# Patient Record
Sex: MALE | Race: WHITE | HISPANIC OR LATINO
[De-identification: names, ages, dates, MRNs, and addresses within clinical notes are randomized per-mention and may not be internally consistent; named-entity substitution may affect disease eponyms.]

---

## 2017-04-21 ENCOUNTER — APPOINTMENT (OUTPATIENT)
Dept: OPHTHALMOLOGY | Facility: CLINIC | Age: 42
End: 2017-04-21

## 2017-04-21 RX ORDER — LEVOTHYROXINE SODIUM 137 UG/1
TABLET ORAL
Refills: 0 | Status: ACTIVE | COMMUNITY

## 2018-08-17 ENCOUNTER — APPOINTMENT (OUTPATIENT)
Dept: OPHTHALMOLOGY | Facility: CLINIC | Age: 43
End: 2018-08-17
Payer: MEDICARE

## 2018-08-17 DIAGNOSIS — H18.453 NODULAR CORNEAL DEGENERATION, BILATERAL: ICD-10-CM

## 2018-08-17 DIAGNOSIS — H04.123 DRY EYE SYNDROME OF BILATERAL LACRIMAL GLANDS: ICD-10-CM

## 2018-08-17 PROCEDURE — 92012 INTRM OPH EXAM EST PATIENT: CPT

## 2018-08-17 PROCEDURE — 92025 CPTRIZED CORNEAL TOPOGRAPHY: CPT

## 2019-10-11 ENCOUNTER — APPOINTMENT (OUTPATIENT)
Dept: OPHTHALMOLOGY | Facility: CLINIC | Age: 44
End: 2019-10-11
Payer: MEDICARE

## 2019-10-11 ENCOUNTER — NON-APPOINTMENT (OUTPATIENT)
Age: 44
End: 2019-10-11

## 2019-10-11 PROCEDURE — 92014 COMPRE OPH EXAM EST PT 1/>: CPT

## 2021-07-15 ENCOUNTER — NON-APPOINTMENT (OUTPATIENT)
Age: 46
End: 2021-07-15

## 2021-07-15 ENCOUNTER — APPOINTMENT (OUTPATIENT)
Dept: OPHTHALMOLOGY | Facility: CLINIC | Age: 46
End: 2021-07-15
Payer: MEDICARE

## 2021-07-15 PROCEDURE — 92014 COMPRE OPH EXAM EST PT 1/>: CPT

## 2022-12-01 ENCOUNTER — APPOINTMENT (OUTPATIENT)
Dept: OPHTHALMOLOGY | Facility: CLINIC | Age: 47
End: 2022-12-01

## 2022-12-01 ENCOUNTER — NON-APPOINTMENT (OUTPATIENT)
Age: 47
End: 2022-12-01

## 2022-12-01 PROCEDURE — 92014 COMPRE OPH EXAM EST PT 1/>: CPT

## 2024-08-01 ENCOUNTER — APPOINTMENT (OUTPATIENT)
Dept: OPHTHALMOLOGY | Facility: CLINIC | Age: 49
End: 2024-08-01
Payer: MEDICARE

## 2024-08-01 ENCOUNTER — NON-APPOINTMENT (OUTPATIENT)
Age: 49
End: 2024-08-01

## 2024-08-01 PROCEDURE — 92014 COMPRE OPH EXAM EST PT 1/>: CPT

## 2024-11-08 ENCOUNTER — NON-APPOINTMENT (OUTPATIENT)
Age: 49
End: 2024-11-08

## 2024-11-08 ENCOUNTER — APPOINTMENT (OUTPATIENT)
Dept: OPHTHALMOLOGY | Facility: CLINIC | Age: 49
End: 2024-11-08
Payer: SELF-PAY

## 2024-11-08 PROCEDURE — 92015 DETERMINE REFRACTIVE STATE: CPT | Mod: NC

## 2025-05-30 ENCOUNTER — INPATIENT (INPATIENT)
Facility: HOSPITAL | Age: 50
LOS: 3 days | Discharge: NURSING, REHAB OR LONG-TERM CARE | DRG: 884 | End: 2025-06-03
Attending: INTERNAL MEDICINE | Admitting: INTERNAL MEDICINE
Payer: MEDICARE

## 2025-05-30 VITALS
HEART RATE: 60 BPM | RESPIRATION RATE: 20 BRPM | HEIGHT: 65 IN | SYSTOLIC BLOOD PRESSURE: 114 MMHG | OXYGEN SATURATION: 98 % | TEMPERATURE: 98 F | WEIGHT: 169.98 LBS | DIASTOLIC BLOOD PRESSURE: 69 MMHG

## 2025-05-30 DIAGNOSIS — R53.1 WEAKNESS: ICD-10-CM

## 2025-05-30 LAB
ALBUMIN SERPL ELPH-MCNC: 4.1 G/DL — SIGNIFICANT CHANGE UP (ref 3.3–5)
ALP SERPL-CCNC: 64 U/L — SIGNIFICANT CHANGE UP (ref 40–120)
ALT FLD-CCNC: 31 U/L — SIGNIFICANT CHANGE UP (ref 10–45)
ANION GAP SERPL CALC-SCNC: 15 MMOL/L — SIGNIFICANT CHANGE UP (ref 5–17)
ANISOCYTOSIS BLD QL: SLIGHT — SIGNIFICANT CHANGE UP
APPEARANCE UR: CLEAR — SIGNIFICANT CHANGE UP
APTT BLD: 28.3 SEC — SIGNIFICANT CHANGE UP (ref 26.1–36.8)
AST SERPL-CCNC: 41 U/L — HIGH (ref 10–40)
BASOPHILS # BLD AUTO: 0.19 K/UL — SIGNIFICANT CHANGE UP (ref 0–0.2)
BASOPHILS NFR BLD AUTO: 4.4 % — HIGH (ref 0–2)
BILIRUB SERPL-MCNC: 0.8 MG/DL — SIGNIFICANT CHANGE UP (ref 0.2–1.2)
BILIRUB UR-MCNC: NEGATIVE — SIGNIFICANT CHANGE UP
BUN SERPL-MCNC: 13 MG/DL — SIGNIFICANT CHANGE UP (ref 7–23)
BURR CELLS BLD QL SMEAR: SLIGHT — SIGNIFICANT CHANGE UP
CALCIUM SERPL-MCNC: 9.1 MG/DL — SIGNIFICANT CHANGE UP (ref 8.4–10.5)
CHLORIDE SERPL-SCNC: 102 MMOL/L — SIGNIFICANT CHANGE UP (ref 96–108)
CO2 SERPL-SCNC: 22 MMOL/L — SIGNIFICANT CHANGE UP (ref 22–31)
COLOR SPEC: YELLOW — SIGNIFICANT CHANGE UP
CREAT SERPL-MCNC: 1 MG/DL — SIGNIFICANT CHANGE UP (ref 0.5–1.3)
DACRYOCYTES BLD QL SMEAR: SLIGHT — SIGNIFICANT CHANGE UP
DIFF PNL FLD: NEGATIVE — SIGNIFICANT CHANGE UP
EGFR: 93 ML/MIN/1.73M2 — SIGNIFICANT CHANGE UP
EGFR: 93 ML/MIN/1.73M2 — SIGNIFICANT CHANGE UP
ELLIPTOCYTES BLD QL SMEAR: SLIGHT — SIGNIFICANT CHANGE UP
EOSINOPHIL # BLD AUTO: 0 K/UL — SIGNIFICANT CHANGE UP (ref 0–0.5)
EOSINOPHIL NFR BLD AUTO: 0 % — SIGNIFICANT CHANGE UP (ref 0–6)
FLUAV AG NPH QL: SIGNIFICANT CHANGE UP
FLUBV AG NPH QL: SIGNIFICANT CHANGE UP
GAS PNL BLDV: SIGNIFICANT CHANGE UP
GIANT PLATELETS BLD QL SMEAR: PRESENT — SIGNIFICANT CHANGE UP
GLUCOSE SERPL-MCNC: 86 MG/DL — SIGNIFICANT CHANGE UP (ref 70–99)
GLUCOSE UR QL: NEGATIVE MG/DL — SIGNIFICANT CHANGE UP
HCT VFR BLD CALC: 37.2 % — LOW (ref 39–50)
HGB BLD-MCNC: 12.1 G/DL — LOW (ref 13–17)
HYPOCHROMIA BLD QL: SLIGHT — SIGNIFICANT CHANGE UP
INR BLD: 1.13 RATIO — SIGNIFICANT CHANGE UP (ref 0.85–1.16)
KETONES UR QL: NEGATIVE MG/DL — SIGNIFICANT CHANGE UP
LEUKOCYTE ESTERASE UR-ACNC: NEGATIVE — SIGNIFICANT CHANGE UP
LYMPHOCYTES # BLD AUTO: 1.63 K/UL — SIGNIFICANT CHANGE UP (ref 1–3.3)
LYMPHOCYTES # BLD AUTO: 36.8 % — SIGNIFICANT CHANGE UP (ref 13–44)
MANUAL SMEAR VERIFICATION: SIGNIFICANT CHANGE UP
MCHC RBC-ENTMCNC: 21.2 PG — LOW (ref 27–34)
MCHC RBC-ENTMCNC: 32.5 G/DL — SIGNIFICANT CHANGE UP (ref 32–36)
MCV RBC AUTO: 65.1 FL — LOW (ref 80–100)
MONOCYTES # BLD AUTO: 0.27 K/UL — SIGNIFICANT CHANGE UP (ref 0–0.9)
MONOCYTES NFR BLD AUTO: 6.1 % — SIGNIFICANT CHANGE UP (ref 2–14)
NEUTROPHILS # BLD AUTO: 2.33 K/UL — SIGNIFICANT CHANGE UP (ref 1.8–7.4)
NEUTROPHILS NFR BLD AUTO: 43 % — SIGNIFICANT CHANGE UP (ref 43–77)
NEUTS BAND # BLD: 9.7 % — HIGH (ref 0–8)
NEUTS BAND NFR BLD: 9.7 % — HIGH (ref 0–8)
NITRITE UR-MCNC: NEGATIVE — SIGNIFICANT CHANGE UP
NRBC # BLD: 1 /100 WBCS — HIGH (ref 0–0)
NRBC BLD-RTO: 1 /100 WBCS — HIGH (ref 0–0)
PH UR: 7 — SIGNIFICANT CHANGE UP (ref 5–8)
PLAT MORPH BLD: ABNORMAL
PLATELET # BLD AUTO: 205 K/UL — SIGNIFICANT CHANGE UP (ref 150–400)
POIKILOCYTOSIS BLD QL AUTO: SIGNIFICANT CHANGE UP
POLYCHROMASIA BLD QL SMEAR: SLIGHT — SIGNIFICANT CHANGE UP
POTASSIUM SERPL-MCNC: 4.9 MMOL/L — SIGNIFICANT CHANGE UP (ref 3.5–5.3)
POTASSIUM SERPL-SCNC: 4.9 MMOL/L — SIGNIFICANT CHANGE UP (ref 3.5–5.3)
PROT SERPL-MCNC: 7.2 G/DL — SIGNIFICANT CHANGE UP (ref 6–8.3)
PROT UR-MCNC: NEGATIVE MG/DL — SIGNIFICANT CHANGE UP
PROTHROM AB SERPL-ACNC: 12.9 SEC — SIGNIFICANT CHANGE UP (ref 9.9–13.4)
RBC # BLD: 5.71 M/UL — SIGNIFICANT CHANGE UP (ref 4.2–5.8)
RBC # FLD: 19.4 % — HIGH (ref 10.3–14.5)
RBC BLD AUTO: ABNORMAL
RSV RNA NPH QL NAA+NON-PROBE: SIGNIFICANT CHANGE UP
SARS-COV-2 RNA SPEC QL NAA+PROBE: SIGNIFICANT CHANGE UP
SODIUM SERPL-SCNC: 139 MMOL/L — SIGNIFICANT CHANGE UP (ref 135–145)
SOURCE RESPIRATORY: SIGNIFICANT CHANGE UP
SP GR SPEC: 1.01 — SIGNIFICANT CHANGE UP (ref 1–1.03)
TARGETS BLD QL SMEAR: SLIGHT — SIGNIFICANT CHANGE UP
TSH SERPL-MCNC: 2.65 UIU/ML — SIGNIFICANT CHANGE UP (ref 0.27–4.2)
UROBILINOGEN FLD QL: 0.2 MG/DL — SIGNIFICANT CHANGE UP (ref 0.2–1)
WBC # BLD: 4.43 K/UL — SIGNIFICANT CHANGE UP (ref 3.8–10.5)
WBC # FLD AUTO: 4.43 K/UL — SIGNIFICANT CHANGE UP (ref 3.8–10.5)

## 2025-05-30 PROCEDURE — 70450 CT HEAD/BRAIN W/O DYE: CPT | Mod: 26,XU

## 2025-05-30 PROCEDURE — 71045 X-RAY EXAM CHEST 1 VIEW: CPT | Mod: 26

## 2025-05-30 PROCEDURE — 99285 EMERGENCY DEPT VISIT HI MDM: CPT | Mod: GC

## 2025-05-30 PROCEDURE — 70496 CT ANGIOGRAPHY HEAD: CPT | Mod: 26

## 2025-05-30 PROCEDURE — 93010 ELECTROCARDIOGRAM REPORT: CPT

## 2025-05-30 PROCEDURE — 70498 CT ANGIOGRAPHY NECK: CPT | Mod: 26

## 2025-05-30 RX ORDER — LEVOTHYROXINE SODIUM 300 MCG
50 TABLET ORAL DAILY
Refills: 0 | Status: DISCONTINUED | OUTPATIENT
Start: 2025-05-30 | End: 2025-06-03

## 2025-05-30 RX ORDER — ENOXAPARIN SODIUM 100 MG/ML
40 INJECTION SUBCUTANEOUS EVERY 24 HOURS
Refills: 0 | Status: DISCONTINUED | OUTPATIENT
Start: 2025-05-30 | End: 2025-06-03

## 2025-05-30 RX ORDER — LEVETIRACETAM 10 MG/ML
1000 INJECTION, SOLUTION INTRAVENOUS
Refills: 0 | Status: DISCONTINUED | OUTPATIENT
Start: 2025-05-30 | End: 2025-06-03

## 2025-05-30 RX ORDER — LANOLIN/MINERAL OIL/PETROLATUM
1 OINTMENT (GRAM) OPHTHALMIC (EYE)
Refills: 0 | Status: DISCONTINUED | OUTPATIENT
Start: 2025-05-30 | End: 2025-06-03

## 2025-05-30 RX ORDER — OMEGA-3-ACID ETHYL ESTERS CAPSULES 1 G/1
2 CAPSULE, LIQUID FILLED ORAL
Refills: 0 | Status: DISCONTINUED | OUTPATIENT
Start: 2025-05-30 | End: 2025-06-03

## 2025-05-30 RX ADMIN — LEVETIRACETAM 1000 MILLIGRAM(S): 10 INJECTION, SOLUTION INTRAVENOUS at 22:13

## 2025-05-30 RX ADMIN — Medication 1 DROP(S): at 22:13

## 2025-05-30 RX ADMIN — Medication 1000 MILLILITER(S): at 11:56

## 2025-05-30 RX ADMIN — Medication 1000 MILLILITER(S): at 15:28

## 2025-05-30 RX ADMIN — Medication 100 MILLILITER(S): at 22:09

## 2025-05-30 NOTE — ED PROVIDER NOTE - ATTENDING CONTRIBUTION TO CARE
Attending MD Moss:   I personally have seen and examined this patient. I personally made/approved the management plan and take responsibility for the patient management.  Resident note reviewed and agree on plan of care and except where noted.  Please see my MDM for further details and patient specific information.

## 2025-05-30 NOTE — ED PROVIDER NOTE - CLINICAL SUMMARY MEDICAL DECISION MAKING FREE TEXT BOX
Attending MD Moss:    Subjective:    - Chief Complaint (CC): 49-year-old male with PMH for Trisomy 21 presenting with generalized weakness, gait instability, and word-finding difficulties of unknown onset. This has been going on for several days per the patients mother.     - History of Present Illness: Rico Salomon, a 49-year-old male, presented to the emergency department with generalized weakness, gait instability, and word-finding difficulties. The exact time of onset is unknown. The patient was brought in from his  facility where staff noticed abnormal symptoms when he arrived by bus. His posture was described as 'off' from the moment he exited the bus until he was escorted to his room. The patient exhibited difficulty forming words, described as 'almost like a stutter, but within his jaw.' The  staff member accompanying the patient reported that these symptoms were present when he arrived at the facility, indicating the onset occurred prior to his arrival at . The patient's mother was reported to be on her way to the hospital at the time of the initial evaluation.    - History: The patient's full medical history is not available at the time of this evaluation. He lives with his mother and attends a  facility regularly. He typically walks independently but is always accompanied by a staff member at the facility. The patient does not use a walker. He is allergic to berries, which cause his face to turn red when consumed. No other significant medical history, surgical history, or family history was obtained during this initial assessment.     Review of Systems:    -  Neurological: Positive for weakness, particularly on the right side, gait instability, and word-finding difficulties.    -  Constitutional: Positive for feeling sleepy.    -  Respiratory: No reported issues with breathing.    -  Genitourinary: Patient reportedly uses the toilet for urination.     Objective:    - Vital Signs:      - Not documented in the provided conversation    - Physical Examination (PE):      - General: Patient is alert and responsive, but appears confused about his location and the identity of familiar individuals.      - Neurological:  strength appears normal bilaterally. Right-sided weakness noted, particularly in lower extremity. Patient has difficulty following complex commands (e.g., 'touch my hand with your foot'). Speech is improved from initial presentation but still abnormal.      - Cranial Nerves: Facial symmetry intact on smile. Able to close eyes tightly and open widely on command, though with some delay in response.      - Musculoskeletal: Decreased strength and mobility in right lower extremity compared to left.      - Respiratory: Clear breath sounds on auscultation.     Differential Diagnosis:    - Acute ischemic stroke, Transient ischemic attack, Ryan's paralysis, Complex migraine, Conversion disorder, Intracranial hemorrhage, Brain tumor     Assessment and Plan:    - Problem 1: Acute Neurological Changes    - Assessment/Plan: The patient presents with acute onset of right-sided weakness, gait instability, and word-finding difficulties, highly suspicious for an acute cerebrovascular event, most likely an ischemic stroke. The differential diagnosis includes TIA, complex migraine, and less likely, conversion disorder or intracranial hemorrhage.      - Obtain immediate CT head without contrast to rule out hemorrhage       - Consider CT angiography of head and neck to evaluate for large vessel occlusion       - Perform 12-lead ECG to assess for arrhythmias       - Order stat labs including CBC, CMP, coagulation studies (PT/INR, PTT), and troponin       - Obtain detailed history from family members regarding last known normal and any recent events       - Monitor neurological status closely       - Consider MRI brain if CT is negative and symptoms persist     EKG independently  interpreted by me shows sinus bradycardia at 52 and IRBBB.

## 2025-05-30 NOTE — ED PROVIDER NOTE - PHYSICAL EXAMINATION
Danna Bennett DO (PGY2)   Physical Exam:    Gen: NAD, non-toxic appearing  Lung: CTAB, no respiratory distress  CV: RRR, no murmurs, rubs or gallops  Abd: soft, NT, ND  Neuro: CN2-12 grossly intact, MS +5/5 in UE and LE BL, gross sensation intact in UE and LE BL, negative pronator drift,

## 2025-05-30 NOTE — ED ADULT NURSE REASSESSMENT NOTE - NS ED NURSE REASSESS COMMENT FT1
pt awake and alert, sitting up in stretcher. Pt changed and cleaned, linens cleaned. Pt awaiting transport to inpatient unit

## 2025-05-30 NOTE — ED PROVIDER NOTE - OBJECTIVE STATEMENT
48 yo M pmhx HLD, hypothyroidism, Down syndrome, Gout presents for evaluation of fatigue and weakness/difficulty walking. Patient's mother states that patient has been fatigued since Sunday but has no focal complaints or signs of infection. Today, staff felt that he was having difficulty ambulating on his own, appeared tired, and was speaking slowly. Denies fever, chills, chest pain, shortness of breath, abd pain, nausea, vomiting, hematuria, dysuria, or any other symptoms at this time. Danna Bennett,  (PGY-2)

## 2025-05-30 NOTE — ED PROVIDER NOTE - PROGRESS NOTE DETAILS
patient was unable to  stand independently, became weak and was assisted to the stretcher by nurse.  Patient's mom states that patient is often lethargic after seizure but does not endorse witnessing any seizure like activity. Danna Bennett,  (PGY-2)

## 2025-05-30 NOTE — H&P ADULT - HISTORY OF PRESENT ILLNESS
48 yo M pmhx HLD, hypothyroidism, Down syndrome, Gout presents for evaluation of fatigue and weakness/difficulty walking. Patient's mother states that patient has been fatigued since Sunday but has no focal complaints or signs of infection. Today, staff felt that he was having difficulty ambulating on his own, appeared tired, and was speaking slowly. Mother states that she spoke to patient's neurologist from [facility] and he rec to come to [facility].

## 2025-05-30 NOTE — ED ADULT NURSE NOTE - OBJECTIVE STATEMENT
48 y/o M A&Ox1 (oriented to self) PMH trisome 21, seizure disorder denies PSH presents to the ED via EMS c/o unsteady gait and weakness. Pt is a diffcult historian. As per EMS facility staff noticed pt had a change in gait, generalized weakness and speech changes upon his arrival to his adult program today. Upon ED arrival pt is well appearing. Breathing is even and unlabored. Skin is warm, dry & in tact. Pt appears tired but awakens to voice. IV access obtained. Comfort & safety provided. Pt aid from adult program at bedside. 50 y/o M A&Ox1 (oriented to self) PMH trisomy 21, seizure disorder denies PSH presents to the ED via EMS c/o unsteady gait and weakness. Pt is a diffcult historian. As per EMS facility staff noticed pt had a change in gait, generalized weakness and speech changes upon his arrival to his adult program today. Upon ED arrival pt is well appearing. Breathing is even and unlabored. Skin is warm, dry & in tact. Pt appears tired but awakens to voice. IV access obtained. Comfort & safety provided. Pt aid from adult program at bedside.

## 2025-05-30 NOTE — ED ADULT NURSE NOTE - NSFALLRISKINTERV_ED_ALL_ED
Assistance OOB with selected safe patient handling equipment if applicable/Assistance with ambulation/Communicate fall risk and risk factors to all staff, patient, and family/Monitor gait and stability/Provide visual cue: yellow wristband, yellow gown, etc/Reinforce activity limits and safety measures with patient and family/Call bell, personal items and telephone in reach/Instruct patient to call for assistance before getting out of bed/chair/stretcher/Non-slip footwear applied when patient is off stretcher/[place] to call system/Physically safe environment - no spills, clutter or unnecessary equipment/Purposeful Proactive Rounding/Room/bathroom lighting operational, light cord in reach

## 2025-05-30 NOTE — PATIENT PROFILE ADULT - FALL HARM RISK - HARM RISK INTERVENTIONS

## 2025-05-30 NOTE — ED ADULT NURSE NOTE - CHIEF COMPLAINT QUOTE
09-Oct-2018 15:30 Generalized weakness and fatigue, "stuttering more than usual" as per Day Program staff.

## 2025-05-30 NOTE — H&P ADULT - ASSESSMENT
The patient is a 49y Male was brought to the ER because of weakness and difficulty in walking.    Generalized weakness:    Hx of seizure  Mother states - he gets weakness after seizure  Neuro eval  EEG  PT eval  IVF    Down syndrome:    Assistance in ADL    Dw pt's mother.

## 2025-05-31 LAB
ALBUMIN SERPL ELPH-MCNC: 4.1 G/DL — SIGNIFICANT CHANGE UP (ref 3.3–5)
ALP SERPL-CCNC: 64 U/L — SIGNIFICANT CHANGE UP (ref 40–120)
ALT FLD-CCNC: 32 U/L — SIGNIFICANT CHANGE UP (ref 10–45)
ANION GAP SERPL CALC-SCNC: 14 MMOL/L — SIGNIFICANT CHANGE UP (ref 5–17)
AST SERPL-CCNC: 29 U/L — SIGNIFICANT CHANGE UP (ref 10–40)
BILIRUB SERPL-MCNC: 0.7 MG/DL — SIGNIFICANT CHANGE UP (ref 0.2–1.2)
BUN SERPL-MCNC: 12 MG/DL — SIGNIFICANT CHANGE UP (ref 7–23)
CALCIUM SERPL-MCNC: 9.2 MG/DL — SIGNIFICANT CHANGE UP (ref 8.4–10.5)
CHLORIDE SERPL-SCNC: 104 MMOL/L — SIGNIFICANT CHANGE UP (ref 96–108)
CO2 SERPL-SCNC: 23 MMOL/L — SIGNIFICANT CHANGE UP (ref 22–31)
CREAT SERPL-MCNC: 0.95 MG/DL — SIGNIFICANT CHANGE UP (ref 0.5–1.3)
CULTURE RESULTS: SIGNIFICANT CHANGE UP
EGFR: 99 ML/MIN/1.73M2 — SIGNIFICANT CHANGE UP
EGFR: 99 ML/MIN/1.73M2 — SIGNIFICANT CHANGE UP
GLUCOSE SERPL-MCNC: 73 MG/DL — SIGNIFICANT CHANGE UP (ref 70–99)
HCT VFR BLD CALC: 39.3 % — SIGNIFICANT CHANGE UP (ref 39–50)
HGB BLD-MCNC: 12.3 G/DL — LOW (ref 13–17)
MCHC RBC-ENTMCNC: 20.6 PG — LOW (ref 27–34)
MCHC RBC-ENTMCNC: 31.3 G/DL — LOW (ref 32–36)
MCV RBC AUTO: 65.7 FL — LOW (ref 80–100)
NRBC BLD AUTO-RTO: 0 /100 WBCS — SIGNIFICANT CHANGE UP (ref 0–0)
PLATELET # BLD AUTO: 202 K/UL — SIGNIFICANT CHANGE UP (ref 150–400)
POTASSIUM SERPL-MCNC: 3.8 MMOL/L — SIGNIFICANT CHANGE UP (ref 3.5–5.3)
POTASSIUM SERPL-SCNC: 3.8 MMOL/L — SIGNIFICANT CHANGE UP (ref 3.5–5.3)
PROT SERPL-MCNC: 6.7 G/DL — SIGNIFICANT CHANGE UP (ref 6–8.3)
RBC # BLD: 5.98 M/UL — HIGH (ref 4.2–5.8)
RBC # FLD: 19 % — HIGH (ref 10.3–14.5)
SODIUM SERPL-SCNC: 141 MMOL/L — SIGNIFICANT CHANGE UP (ref 135–145)
SPECIMEN SOURCE: SIGNIFICANT CHANGE UP
WBC # BLD: 3.73 K/UL — LOW (ref 3.8–10.5)
WBC # FLD AUTO: 3.73 K/UL — LOW (ref 3.8–10.5)

## 2025-05-31 RX ADMIN — Medication 1 DROP(S): at 11:05

## 2025-05-31 RX ADMIN — ENOXAPARIN SODIUM 40 MILLIGRAM(S): 100 INJECTION SUBCUTANEOUS at 06:05

## 2025-05-31 RX ADMIN — Medication 1 DROP(S): at 17:09

## 2025-05-31 RX ADMIN — OMEGA-3-ACID ETHYL ESTERS CAPSULES 2 GRAM(S): 1 CAPSULE, LIQUID FILLED ORAL at 17:09

## 2025-05-31 RX ADMIN — Medication 300 MILLIGRAM(S): at 11:05

## 2025-05-31 RX ADMIN — LEVETIRACETAM 1000 MILLIGRAM(S): 10 INJECTION, SOLUTION INTRAVENOUS at 09:37

## 2025-05-31 RX ADMIN — Medication 50 MICROGRAM(S): at 08:40

## 2025-05-31 RX ADMIN — Medication 1 DROP(S): at 08:41

## 2025-05-31 RX ADMIN — OMEGA-3-ACID ETHYL ESTERS CAPSULES 2 GRAM(S): 1 CAPSULE, LIQUID FILLED ORAL at 09:36

## 2025-05-31 RX ADMIN — Medication 500 MILLIGRAM(S): at 11:05

## 2025-05-31 RX ADMIN — LEVETIRACETAM 1000 MILLIGRAM(S): 10 INJECTION, SOLUTION INTRAVENOUS at 21:06

## 2025-05-31 NOTE — PHYSICAL THERAPY INITIAL EVALUATION ADULT - DIAGNOSIS, PT EVAL
Pt presents with mild impairments in strength, balance, endurance all impacting ability to perform ADLs and functional mobility

## 2025-05-31 NOTE — PHYSICAL THERAPY INITIAL EVALUATION ADULT - ADDITIONAL COMMENTS
As per [facility]  (bedside), pt resides in group [place] M-F with 3-4 steps to enter side entrance with bilateral handrail assist and first floor setup. Pt able to perform basic ADLs and functional ambulation with assist as needed/supervision from staff. Pt normally ambulates independently without AD, accompanied by staff for occasional balance deficits. As per shelter  (bedside), pt resides in group home M-F with 3-4 steps to enter side entrance with bilateral handrail assist and first floor setup. Pt able to perform basic ADLs and functional ambulation with assist as needed/supervision from staff. Pt normally ambulates independently without AD, accompanied by staff for occasional balance deficits. Patient goes home to [facility] on weekends 3+4 steps to enter and 1 FOS inside.

## 2025-05-31 NOTE — PHYSICAL THERAPY INITIAL EVALUATION ADULT - LEVEL OF INDEPENDENCE: SIT/STAND, REHAB EVAL
Mod Ax1 progressing to Min-CGAx1 with repetition/minimum assist (75% patients effort)/moderate assist (50% patients effort)

## 2025-05-31 NOTE — PHYSICAL THERAPY INITIAL EVALUATION ADULT - GENERAL OBSERVATIONS, REHAB EVAL
Pt received in bed in NAD, +condom cath, +IVL, +Group Home  bedside, VSS, agreeable to participate in therapy at this time

## 2025-05-31 NOTE — PHYSICAL THERAPY INITIAL EVALUATION ADULT - NSPTDISCHREC_GEN_A_CORE
resume assist/supervision for ALL aspects of functional mobility and ADLs from group home/mother/Home PT

## 2025-05-31 NOTE — PHYSICAL THERAPY INITIAL EVALUATION ADULT - PERTINENT HX OF CURRENT PROBLEM, REHAB EVAL
48 yo M pmhx HLD, hypothyroidism, Down syndrome, Gout presents for evaluation of fatigue and weakness/difficulty walking. Patient's mother states that patient has been fatigued since Sunday but has no focal complaints or signs of infection. Staff felt that he was having difficulty ambulating on his own, appeared tired, and was speaking slowly. Mother states that she spoke to patient's neurologist from [facility] and he rec to come to [facility].    50 y/o M A&Ox1 (oriented to self) PMH trisomy 21, seizure disorder denies PSH presents to the ED via EMS c/o unsteady gait and weakness    49-year-old male, presented to the emergency department with generalized weakness, gait instability, and word-finding difficulties. The exact time of onset is unknown. The patient was brought in from his  facility where staff noticed abnormal symptoms when he arrived by bus. His posture was described as 'off' from the moment he exited the bus until he was escorted to his room. The patient exhibited difficulty forming words, described as 'almost like a stutter, but within his jaw.' The  staff member accompanying the patient reported that these symptoms were present when he arrived at the facility, indicating the onset occurred prior to his arrival at . The patient's mother was reported to be on her way to the hospital at the time of the initial evaluation. 50 yo M pmhx HLD, hypothyroidism, Down syndrome, seizure disorder, Gout presents for evaluation of fatigue and weakness/difficulty walking. Patient's mother states that patient has been fatigued since Sunday but has no focal complaints or signs of infection. Staff felt that he was having difficulty ambulating on his own, appeared tired, and was speaking slowly. Mother states that she spoke to patient's neurologist from [facility] and he rec to come to [facility]. The patient was brought in from his  facility where staff noticed abnormal symptoms when he arrived by bus. His posture was described as 'off' from the moment he exited the bus until he was escorted to his room. The patient exhibited difficulty forming words, described as 'almost like a stutter, but within his jaw.' The  staff member accompanying the patient reported that these symptoms were present when he arrived at the facility, indicating the onset occurred prior to his arrival at . Pt admitted for unsteady gait and weakness.    CT Head, CTA Head & Neck 5/30/25: IMPRESSION: No acute hemorrhage mass or mass effect. Unremarkable CTA neck and [place] of [place].  Chest X-Ray 5/30/25: No focal consolidation.

## 2025-06-01 LAB
ANION GAP SERPL CALC-SCNC: 14 MMOL/L — SIGNIFICANT CHANGE UP (ref 5–17)
BUN SERPL-MCNC: 14 MG/DL — SIGNIFICANT CHANGE UP (ref 7–23)
CALCIUM SERPL-MCNC: 9.1 MG/DL — SIGNIFICANT CHANGE UP (ref 8.4–10.5)
CHLORIDE SERPL-SCNC: 103 MMOL/L — SIGNIFICANT CHANGE UP (ref 96–108)
CO2 SERPL-SCNC: 23 MMOL/L — SIGNIFICANT CHANGE UP (ref 22–31)
CREAT SERPL-MCNC: 0.86 MG/DL — SIGNIFICANT CHANGE UP (ref 0.5–1.3)
EGFR: 107 ML/MIN/1.73M2 — SIGNIFICANT CHANGE UP
EGFR: 107 ML/MIN/1.73M2 — SIGNIFICANT CHANGE UP
GLUCOSE SERPL-MCNC: 84 MG/DL — SIGNIFICANT CHANGE UP (ref 70–99)
HCT VFR BLD CALC: 38 % — LOW (ref 39–50)
HGB BLD-MCNC: 12.1 G/DL — LOW (ref 13–17)
MCHC RBC-ENTMCNC: 20.6 PG — LOW (ref 27–34)
MCHC RBC-ENTMCNC: 31.8 G/DL — LOW (ref 32–36)
MCV RBC AUTO: 64.8 FL — LOW (ref 80–100)
NRBC BLD AUTO-RTO: 0 /100 WBCS — SIGNIFICANT CHANGE UP (ref 0–0)
PLATELET # BLD AUTO: 194 K/UL — SIGNIFICANT CHANGE UP (ref 150–400)
POTASSIUM SERPL-MCNC: 3.8 MMOL/L — SIGNIFICANT CHANGE UP (ref 3.5–5.3)
POTASSIUM SERPL-SCNC: 3.8 MMOL/L — SIGNIFICANT CHANGE UP (ref 3.5–5.3)
RBC # BLD: 5.86 M/UL — HIGH (ref 4.2–5.8)
RBC # FLD: 19 % — HIGH (ref 10.3–14.5)
SODIUM SERPL-SCNC: 140 MMOL/L — SIGNIFICANT CHANGE UP (ref 135–145)
WBC # BLD: 4.34 K/UL — SIGNIFICANT CHANGE UP (ref 3.8–10.5)
WBC # FLD AUTO: 4.34 K/UL — SIGNIFICANT CHANGE UP (ref 3.8–10.5)

## 2025-06-01 RX ADMIN — Medication 500 MILLIGRAM(S): at 11:58

## 2025-06-01 RX ADMIN — OMEGA-3-ACID ETHYL ESTERS CAPSULES 2 GRAM(S): 1 CAPSULE, LIQUID FILLED ORAL at 18:01

## 2025-06-01 RX ADMIN — Medication 300 MILLIGRAM(S): at 11:57

## 2025-06-01 RX ADMIN — OMEGA-3-ACID ETHYL ESTERS CAPSULES 2 GRAM(S): 1 CAPSULE, LIQUID FILLED ORAL at 05:40

## 2025-06-01 RX ADMIN — Medication 1 DROP(S): at 18:01

## 2025-06-01 RX ADMIN — ENOXAPARIN SODIUM 40 MILLIGRAM(S): 100 INJECTION SUBCUTANEOUS at 05:41

## 2025-06-01 RX ADMIN — LEVETIRACETAM 1000 MILLIGRAM(S): 10 INJECTION, SOLUTION INTRAVENOUS at 21:20

## 2025-06-01 RX ADMIN — LEVETIRACETAM 1000 MILLIGRAM(S): 10 INJECTION, SOLUTION INTRAVENOUS at 11:57

## 2025-06-01 RX ADMIN — Medication 1 DROP(S): at 05:47

## 2025-06-01 RX ADMIN — Medication 50 MICROGRAM(S): at 05:41

## 2025-06-01 RX ADMIN — Medication 1 DROP(S): at 11:57

## 2025-06-01 NOTE — CONSULT NOTE ADULT - ASSESSMENT
50 yo M with HLD, hypothyroidism, Trisomy 21/Down syndrome, Gout presents for evaluation of fatigue and weakness/difficulty walking. Patient's mother states that patient has been fatigued since Sunday PTA but has no focal complaints or signs of infection. on day of admission, staff felt that he was having difficulty ambulating on his own, appeared tired, and was speaking slowly. Mother states that she spoke to patient's neurologist from [facility] and he rec to come to [facility].   CTH neg   CTA H/N neg   Impression:   1) AMS in setting of downs syndrome/trisomay 21  2) seizure h/o   3) weakness     - on keppra 1g BID; no change fo rnow   - infectous workup  - routine EEG  r/o seiuzres  - check TSH    - PT/OT    - check FS, glucose control <180  - GI/DVT ppx'  - Thank you for allowing me to participate in the care of this patient. Call with questions.   Rodney Schroeder MD  Vascular Neurology  Office: [phone]

## 2025-06-01 NOTE — CONSULT NOTE ADULT - SUBJECTIVE AND OBJECTIVE BOX
Neurology Consult    Reason for Consult: Patient is a 49y old  Male who presents with a chief complaint of The patient is a 49y Male was brought to the ER because of weakness and difficulty in walking. (31 May 2025 16:46)      HPI:   50 yo M pmhx HLD, hypothyroidism, Down syndrome, Gout presents for evaluation of fatigue and weakness/difficulty walking. Patient's mother states that patient has been fatigued since Sunday but has no focal complaints or signs of infection. Today, staff felt that he was having difficulty ambulating on his own, appeared tired, and was speaking slowly. Mother states that she spoke to patient's neurologist from [facility] and he rec to come to [facility].  (30 May 2025 18:33)       PAST MEDICAL & SURGICAL HISTORY:  Seizure      HTN (hypertension)          Allergies: Allergies    No Known Allergies    Intolerances        Social History: Denies toxic habits including tobacco, ETOH or illicit drugs.    Family History: FAMILY HISTORY:  . No family history of strokes    Medications: MEDICATIONS  (STANDING):  allopurinol 300 milliGRAM(s) Oral daily  artificial tears (preservative free) Ophthalmic Solution 1 Drop(s) Both EYES four times a day  ascorbic acid 500 milliGRAM(s) Oral daily  enoxaparin Injectable 40 milliGRAM(s) SubCutaneous every 24 hours  levETIRAcetam 1000 milliGRAM(s) Oral two times a day  levothyroxine 50 MICROGram(s) Oral daily  omega-3-Acid Ethyl Esters 2 Gram(s) Oral two times a day  sodium chloride 0.9%. 1000 milliLiter(s) (100 mL/Hr) IV Continuous <Continuous>    MEDICATIONS  (PRN):      Review of Systems: lmited given mental status   CONSTITUTIONAL:  No weight loss, fever, chills, weakness or fatigue.  HEENT:  Eyes:  No visual loss, blurred vision, double vision or yellow sclera. Ears, Nose, Throat:  No hearing loss, sneezing, congestion, runny nose or sore throat.  SKIN:  No rash or itching.  CARDIOVASCULAR:  No chest pain, chest pressure or chest discomfort. No palpitations or edema.  RESPIRATORY:  No shortness of breath, cough or sputum.  GASTROINTESTINAL:  No anorexia, nausea, vomiting or diarrhea. No abdominal pain or blood.  GENITOURINARY:  No burning on urination or incontinence   NEUROLOGICAL:  No headache, dizziness, syncope, paralysis, ataxia, numbness or tingling in the extremities. No change in bowel or bladder control. no limb weakness. no vision changes.   MUSCULOSKELETAL:  No muscle, back pain, joint pain or stiffness.  HEMATOLOGIC:  No anemia, bleeding or bruising.  LYMPHATICS:  No enlarged nodes. No history of splenectomy.  PSYCHIATRIC:  No history of depression or anxiety.  ENDOCRINOLOGIC:  No reports of sweating, cold or heat intolerance. No polyuria or polydipsia.      Vitals:  Vital Signs Last 24 Hrs  T(C): 36.7 (31 May 2025 21:22), Max: 36.7 (31 May 2025 21:22)  T(F): 98 (31 May 2025 21:22), Max: 98 (31 May 2025 21:22)  HR: 70 (31 May 2025 21:22) (56 - 70)  BP: 97/66 (31 May 2025 21:22) (96/65 - 104/68)  BP(mean): --  RR: 18 (31 May 2025 21:22) (17 - 18)  SpO2: 94% (31 May 2025 21:22) (94% - 100%)    Parameters below as of 31 May 2025 21:22  Patient On (Oxygen Delivery Method): room air        General Exam:   General Appearance: Appropriately dressed and in no acute distress       Head: Normocephalic, atraumatic and no dysmorphic features ; features c/w trisomy 21   Ear, Nose, and Throat: Moist mucous membranes  CVS: S1S2+  Resp: No SOB, no wheeze or rhonchi  GI: soft NT/ND  Extremities: No edema or cyanosis  Skin: No bruises or rashes     Neurological Exam:  Mental Status: Awake, alert and oriented x 1-2   Able to follow simple and complex verbal commands. Able to name and repeat. fluent speech. No obvious aphasia or dysarthria noted.   Cranial Nerves: PERRL, EOMI, VFFC, sensation V1-V3 intact,  no obvious facial asymmetry, features c/w trisomy 21  equal elevation of palate, scm/trap 5/5, tongue is midline on protrusion. no obvious papilledema on fundoscopic exam. hearing is grossly intact.   Motor: Normal bulk, tone and strength throughout. Fine finger movements were intact and symmetric. no tremors or drift noted.    Sensation: Intact to light touch and pinprick throughout. no right/left confusion.    Reflexes: 1+ throughout at biceps, brachioradialis, triceps, patellars and ankles bilaterally and equal. No clonus. R toe and L toe were both downgoing.  Coordination: No dysmetria on FNF   Gait: deferred     Data/Labs/Imaging which I personally reviewed.     Labs:     CBC Full  -  ( 01 Jun 2025 07:08 )  WBC Count : 4.34 K/uL  RBC Count : 5.86 M/uL  Hemoglobin : 12.1 g/dL  Hematocrit : 38.0 %  Platelet Count - Automated : 194 K/uL  Mean Cell Volume : 64.8 fl  Mean Cell Hemoglobin : 20.6 pg  Mean Cell Hemoglobin Concentration : 31.8 g/dL  Auto Neutrophil # : x  Auto Lymphocyte # : x  Auto Monocyte # : x  Auto Eosinophil # : x  Auto Basophil # : x  Auto Neutrophil % : x  Auto Lymphocyte % : x  Auto Monocyte % : x  Auto Eosinophil % : x  Auto Basophil % : x    06-01    140  |  103  |  14  ----------------------------<  84  3.8   |  23  |  0.86    Ca    9.1      01 Jun 2025 07:03    TPro  6.7  /  Alb  4.1  /  TBili  0.7  /  DBili  x   /  AST  29  /  ALT  32  /  AlkPhos  64  05-31    LIVER FUNCTIONS - ( 31 May 2025 07:24 )  Alb: 4.1 g/dL / Pro: 6.7 g/dL / ALK PHOS: 64 U/L / ALT: 32 U/L / AST: 29 U/L / GGT: x           PT/INR - ( 30 May 2025 11:55 )   PT: 12.9 sec;   INR: 1.13 ratio         PTT - ( 30 May 2025 11:55 )  PTT:28.3 sec  Urinalysis Basic - ( 01 Jun 2025 07:03 )    Color: x / Appearance: x / SG: x / pH: x  Gluc: 84 mg/dL / Ketone: x  / Bili: x / Urobili: x   Blood: x / Protein: x / Nitrite: x   Leuk Esterase: x / RBC: x / WBC x   Sq Epi: x / Non Sq Epi: x / Bacteria: x      < from: CT Head No Cont (05.30.25 @ 14:17) >    ACC: [id number] EXAM:  CT ANGIO BRAIN (W)AW IC   ORDERED BY:  BRAXTON DALLAS     ACC: [id number] EXAM:  CT ANGIO NECK (W)AW IC   ORDERED BY:  BRAXTON [facility]     ACC: [id number] EXAM:  CT BRAIN   ORDERED BY:  BRAXTON DALLAS     PROCEDURE DATE:  05/30/2025          INTERPRETATION:  CLINICAL INFORMATION: weakness    COMPARISON: None.    Multiple axial sections were performed from the base of skull to vertex   without contrast enhancement. Coronal and sagittal reconstructed images   were performed    The patient was injected with approximately 90 cc of Omnipaque 350 IV.   CTA of neck and [place] of [place] performed. Coronal cervical   reconstructed images were performed. 3-D MIPS performed.    Parenchymal volume loss and chronic microvascular ischemic changes are   identified    There is no acute hemorrhage mass or mass effect seen.    Evaluation of the osseous structures with the appropriate window appears   unremarkable    The visualized paranasal sinuses mastoid and middle ear regions appear   clear.    Both distal common carotid, proximal internal and external carotid   arteries appear normal. Normal flow seen involving both vertebral   arteries. No significant stenosis is seen.    Both distal internal carotid arteries appear normal.    Both anterior cerebral middle cerebral basilar and posterior cerebral   arteries appear normal without evidence of an aneurysm or significant   stenosis    The dural venous sinuses demonstrate normal enhancement    Evaluation of the soft tissue neck regionappears normal    The visualized portions of both lung apices appear clear.    Mild degenerative changes involving the cervical spine.    IMPRESSION: No acute hemorrhage mass or mass effect    Unremarkable CTA neck and [place] of [place].        --- End of Report ---            DOROTA ERICKSON MD; Attending Radiologist  This document has been electronically signed. May 30 2025  2:26PM    < end of copied text >

## 2025-06-02 ENCOUNTER — TRANSCRIPTION ENCOUNTER (OUTPATIENT)
Age: 50
End: 2025-06-02

## 2025-06-02 LAB
HCT VFR BLD CALC: 36.9 % — LOW (ref 39–50)
HGB BLD-MCNC: 11.9 G/DL — LOW (ref 13–17)
MCHC RBC-ENTMCNC: 20.7 PG — LOW (ref 27–34)
MCHC RBC-ENTMCNC: 32.2 G/DL — SIGNIFICANT CHANGE UP (ref 32–36)
MCV RBC AUTO: 64.2 FL — LOW (ref 80–100)
NRBC BLD AUTO-RTO: 0 /100 WBCS — SIGNIFICANT CHANGE UP (ref 0–0)
PLATELET # BLD AUTO: 191 K/UL — SIGNIFICANT CHANGE UP (ref 150–400)
RBC # BLD: 5.75 M/UL — SIGNIFICANT CHANGE UP (ref 4.2–5.8)
RBC # FLD: 18.7 % — HIGH (ref 10.3–14.5)
WBC # BLD: 4.39 K/UL — SIGNIFICANT CHANGE UP (ref 3.8–10.5)
WBC # FLD AUTO: 4.39 K/UL — SIGNIFICANT CHANGE UP (ref 3.8–10.5)

## 2025-06-02 PROCEDURE — 95816 EEG AWAKE AND DROWSY: CPT | Mod: 26

## 2025-06-02 RX ORDER — LEVOTHYROXINE SODIUM 300 MCG
1 TABLET ORAL
Refills: 0 | DISCHARGE

## 2025-06-02 RX ORDER — CLOTRIMAZOLE 1 G/100G
1 CREAM TOPICAL
Refills: 0 | DISCHARGE

## 2025-06-02 RX ORDER — GUAR GUM
15 PACKET (EA) ORAL
Refills: 0 | DISCHARGE

## 2025-06-02 RX ORDER — LEVETIRACETAM 10 MG/ML
1 INJECTION, SOLUTION INTRAVENOUS
Refills: 0 | DISCHARGE

## 2025-06-02 RX ORDER — DOCUSATE SODIUM 100 MG
2 CAPSULE ORAL
Refills: 0 | DISCHARGE

## 2025-06-02 RX ORDER — OMEGA-3-ACID ETHYL ESTERS CAPSULES 1 G/1
2 CAPSULE, LIQUID FILLED ORAL
Refills: 0 | DISCHARGE

## 2025-06-02 RX ORDER — LANOLIN/MINERAL OIL/PETROLATUM
1 OINTMENT (GRAM) OPHTHALMIC (EYE)
Refills: 0 | DISCHARGE

## 2025-06-02 RX ADMIN — Medication 1 DROP(S): at 11:08

## 2025-06-02 RX ADMIN — OMEGA-3-ACID ETHYL ESTERS CAPSULES 2 GRAM(S): 1 CAPSULE, LIQUID FILLED ORAL at 05:50

## 2025-06-02 RX ADMIN — LEVETIRACETAM 1000 MILLIGRAM(S): 10 INJECTION, SOLUTION INTRAVENOUS at 21:18

## 2025-06-02 RX ADMIN — Medication 1 DROP(S): at 17:13

## 2025-06-02 RX ADMIN — OMEGA-3-ACID ETHYL ESTERS CAPSULES 2 GRAM(S): 1 CAPSULE, LIQUID FILLED ORAL at 17:13

## 2025-06-02 RX ADMIN — Medication 1 DROP(S): at 05:50

## 2025-06-02 RX ADMIN — Medication 50 MICROGRAM(S): at 05:50

## 2025-06-02 RX ADMIN — Medication 300 MILLIGRAM(S): at 08:35

## 2025-06-02 RX ADMIN — Medication 1 DROP(S): at 21:18

## 2025-06-02 RX ADMIN — Medication 1 DROP(S): at 00:33

## 2025-06-02 RX ADMIN — ENOXAPARIN SODIUM 40 MILLIGRAM(S): 100 INJECTION SUBCUTANEOUS at 05:50

## 2025-06-02 RX ADMIN — Medication 500 MILLIGRAM(S): at 08:35

## 2025-06-02 RX ADMIN — LEVETIRACETAM 1000 MILLIGRAM(S): 10 INJECTION, SOLUTION INTRAVENOUS at 09:54

## 2025-06-02 NOTE — PROGRESS NOTE ADULT - ASSESSMENT
The patient is a 49y Male was brought to the ER because of weakness and difficulty in walking.    Generalized weakness:    Hx of seizure  Mother states - he gets weakness after seizure  Neuro eval called  EEG  PT eval appreciated  S/p IVF    Down syndrome:    Assistance in ADL    Dw pt's mother.  
The patient is a 49y Male was brought to the ER because of weakness and difficulty in walking.    Generalized weakness:    Hx of seizure  Mother states - he gets weakness after seizure  Neuro eval appreciated  EEG  PT eval appreciated  S/p IVF  Improving    Down syndrome:    Assistance in ADL    Dw pt's mother.  
The patient is a 49y Male was brought to the ER because of weakness and difficulty in walking.    Generalized weakness:    Hx of seizure  Mother states - he gets weakness after seizure  Neuro eval appreciated  EEG: No epileptiform activity  PT eval appreciated  Improving    Down syndrome:    Assistance in ADL    Dw pt's mother.

## 2025-06-02 NOTE — DISCHARGE NOTE PROVIDER - PROVIDER TOKENS
PROVIDER:[TOKEN:[[id number]],FOLLOWUP:[1 week]],PROVIDER:[TOKEN:[[id number]],FOLLOWUP:[2 weeks]]

## 2025-06-02 NOTE — PROGRESS NOTE ADULT - REASON FOR ADMISSION
The patient is a 49y Male was brought to the ER because of weakness and difficulty in walking.

## 2025-06-02 NOTE — EEG REPORT - NS EEG TEXT BOX
Patient Name: Rico Salomon    Age: 49 year  : 1975  Location: -  Referring Physician: -    EEG #: [id number]  Study Date: 2025   Start Time: 10:44:54 AM     Study Duration: 21.3 minutes    ------------------------------------------------------------------  Technical Information:					  On Instrument: [id number]  Placement and Labeling of Electrodes:  The EEG was performed utilizing 20 channels referential EEG connections (coronal over temporal over parasagittal montage) using all standard 10-20 electrode placements with EKG.  Recording was at a sampling rate of 256 samples per second per channel.  Time synchronized digital video recording was done simultaneously with EEG recording.  A low light infrared camera was used for low light recording.  Damien and seizure detection algorithms were utilized.  ------------------------------------------------------------------  History:  - 49 year old Male with Down Syndrome and AMS is here for evaluation      Medication  Laxapro    Keppra (Levetiracetam)    ------------------------------------------------------------------  Study Interpretation:    FINDINGS:    Background:  The background was continuous, spontaneously variable and reactive.  During wakefulness, the posteriorly dominant rhythm is unclear and the background is diffusely slow consisting of polymorphic delta theta activity up to 6 Hz      GENERALIZED SLOWING: present. As above  FOCAL SLOWING: none was present.    Sleep Background:  Drowsiness was characterized by fragmentation, attenuation, and slowing of the background activity.    Stage II sleep transients were not recorded.      Non-epileptiform activity:  None.    Epileptiform Activity:   No epileptiform discharges were present.    Events:  No clinical events were recorded.  No seizures were recorded.    Activation Procedures:   Hyperventilation was not performed.    Photic stimulation was not performed.      Artifacts:  Intermittent myogenic and movement artifacts were noted.    ECG:  The heart rate on single channel ECG was predominantly between 60-70 BPM.  ------------------------------------------------------------------  EEG CLASSIFICATION:    Abnormal EEG in the awake, and drowsy states.  1. Mild to moderate diffuse background slowing  	    ------------------------------------------------------------------  CLINICAL IMPRESSION:  Mild to Moderate nonspecific diffuse or multifocal cerebral dysfunction.     No epileptiform pattern or seizure recorded.  ------------------------------------------------------------------    PEDRO Bruno  Attending Physician, [facility]    ------------------------------------  EEG Reading Room: [phone]  On Call Service After Hours: [phone]

## 2025-06-02 NOTE — DISCHARGE NOTE PROVIDER - CARE PROVIDERS DIRECT ADDRESSES
,[email or web address]

## 2025-06-02 NOTE — DISCHARGE NOTE PROVIDER - NSFTFHOMEHTHYNRD_GEN_ALL_CORE
Pt in via EMS from [address] [facility] after waking up at gaudencio Yes

## 2025-06-02 NOTE — DISCHARGE NOTE PROVIDER - NSDCCPCAREPLAN_GEN_ALL_CORE_FT
PRINCIPAL DISCHARGE DIAGNOSIS  Diagnosis: Weakness  Assessment and Plan of Treatment:   Paco has been admitted with weakness and difficulty walking  EEG negative for seizures  CT Head negative for acute findings  Lepra level WNL   Orthostatics negative   C/w Keppra as prescribed  Follow up with neurology in 1 week   Maintain fall precautions, use walker as needed,   encouraged to get up slowly to prevent dizziness/ fall. Call for assistance if needed before getting up   Stay hydrated   Report any acute changes to MD or call 911      SECONDARY DISCHARGE DIAGNOSES  Diagnosis: Hyperlipidemia  Assessment and Plan of Treatment: Low salt, low fat, low cholesterol, diabetic diet if appropriate  Continue medication as prescribed  Exercise, increase your activity level  Pt. verbalized an understanding of all instructions.

## 2025-06-02 NOTE — PROGRESS NOTE ADULT - SUBJECTIVE AND OBJECTIVE BOX
Patient is a 49y old  Male who presents with a chief complaint of The patient is a 49y Male was brought to the ER because of weakness and difficulty in walking. (01 Jun 2025 09:02)      SUBJECTIVE / OVERNIGHT EVENTS:    Events noted.  No N/V  Sitting in a chair    MEDICATIONS  (STANDING):  allopurinol 300 milliGRAM(s) Oral daily  artificial tears (preservative free) Ophthalmic Solution 1 Drop(s) Both EYES four times a day  ascorbic acid 500 milliGRAM(s) Oral daily  enoxaparin Injectable 40 milliGRAM(s) SubCutaneous every 24 hours  levETIRAcetam 1000 milliGRAM(s) Oral two times a day  levothyroxine 50 MICROGram(s) Oral daily  omega-3-Acid Ethyl Esters 2 Gram(s) Oral two times a day  sodium chloride 0.9%. 1000 milliLiter(s) (100 mL/Hr) IV Continuous <Continuous>    MEDICATIONS  (PRN):        CAPILLARY BLOOD GLUCOSE        I&O's Summary    31 May 2025 07:01  -  01 Jun 2025 07:00  --------------------------------------------------------  IN: 240 mL / OUT: 850 mL / NET: -610 mL    01 Jun 2025 07:01  -  01 Jun 2025 18:11  --------------------------------------------------------  IN: 360 mL / OUT: 0 mL / NET: 360 mL        T(C): 36.3 (06-01-25 @ 12:00), Max: 36.7 (05-31-25 @ 21:22)  HR: 64 (06-01-25 @ 12:00) (64 - 70)  BP: 101/64 (06-01-25 @ 12:00) (97/66 - 101/64)  RR: 18 (06-01-25 @ 12:00) (18 - 18)  SpO2: 94% (06-01-25 @ 12:00) (94% - 94%)    PHYSICAL EXAM:    NECK: Supple, No JVD  CHEST/LUNG: Clear to auscultation bilaterally; No wheezing.  HEART: Regular rate and rhythm; No murmurs, rubs, or gallops  ABDOMEN: Soft, Nontender, Nondistended; Bowel sounds present  EXTREMITIES:   No edema  NEUROLOGY: AA      LABS:                        12.1   4.34  )-----------( 194      ( 01 Jun 2025 07:08 )             38.0     06-01    140  |  103  |  14  ----------------------------<  84  3.8   |  23  |  0.86    Ca    9.1      01 Jun 2025 07:03    TPro  6.7  /  Alb  4.1  /  TBili  0.7  /  DBili  x   /  AST  29  /  ALT  32  /  AlkPhos  64  05-31          Urinalysis Basic - ( 01 Jun 2025 07:03 )    Color: x / Appearance: x / SG: x / pH: x  Gluc: 84 mg/dL / Ketone: x  / Bili: x / Urobili: x   Blood: x / Protein: x / Nitrite: x   Leuk Esterase: x / RBC: x / WBC x   Sq Epi: x / Non Sq Epi: x / Bacteria: x      CAPILLARY BLOOD GLUCOSE            RADIOLOGY & ADDITIONAL TESTS:    Imaging Personally Reviewed:    Consultant(s) Notes Reviewed:      Care Discussed with Consultants/Other Providers:    Alonso Diamond MD, CMD, FACP    [address] [id number]  Office Tel: [phone]    
Patient is a 49y old  Male who presents with a chief complaint of The patient is a 49y Male was brought to the ER because of weakness and difficulty in walking. (02 Jun 2025 14:58)      SUBJECTIVE / OVERNIGHT EVENTS:    Events noted.  Awake  No N/V      MEDICATIONS  (STANDING):  allopurinol 300 milliGRAM(s) Oral daily  artificial tears (preservative free) Ophthalmic Solution 1 Drop(s) Both EYES four times a day  ascorbic acid 500 milliGRAM(s) Oral daily  enoxaparin Injectable 40 milliGRAM(s) SubCutaneous every 24 hours  levETIRAcetam 1000 milliGRAM(s) Oral two times a day  levothyroxine 50 MICROGram(s) Oral daily  omega-3-Acid Ethyl Esters 2 Gram(s) Oral two times a day  sodium chloride 0.9%. 1000 milliLiter(s) (100 mL/Hr) IV Continuous <Continuous>    MEDICATIONS  (PRN):        CAPILLARY BLOOD GLUCOSE        I&O's Summary    01 Jun 2025 07:01  -  02 Jun 2025 07:00  --------------------------------------------------------  IN: 840 mL / OUT: 2725 mL / NET: -1885 mL        T(C): 36.5 (06-02-25 @ 21:51), Max: 36.7 (06-02-25 @ 12:34)  HR: 62 (06-02-25 @ 21:51) (62 - 66)  BP: 99/65 (06-02-25 @ 21:51) (99/65 - 103/69)  RR: 18 (06-02-25 @ 21:51) (18 - 18)  SpO2: 96% (06-02-25 @ 21:51) (96% - 98%)    PHYSICAL EXAM:    NECK: Supple, No JVD  CHEST/LUNG: Clear to auscultation bilaterally; No wheezing.  HEART: Regular rate and rhythm; No murmurs, rubs, or gallops  ABDOMEN: Soft, Nontender, Nondistended; Bowel sounds present  EXTREMITIES:   No edema  NEUROLOGY: AA      LABS:                        11.9   4.39  )-----------( 191      ( 02 Jun 2025 06:59 )             36.9     06-01    140  |  103  |  14  ----------------------------<  84  3.8   |  23  |  0.86    Ca    9.1      01 Jun 2025 07:03            Urinalysis Basic - ( 01 Jun 2025 07:03 )    Color: x / Appearance: x / SG: x / pH: x  Gluc: 84 mg/dL / Ketone: x  / Bili: x / Urobili: x   Blood: x / Protein: x / Nitrite: x   Leuk Esterase: x / RBC: x / WBC x   Sq Epi: x / Non Sq Epi: x / Bacteria: x      CAPILLARY BLOOD GLUCOSE            RADIOLOGY & ADDITIONAL TESTS:    Imaging Personally Reviewed:    Consultant(s) Notes Reviewed:      Care Discussed with Consultants/Other Providers:    Alonso Diamond MD, CMD, FACP    [address] [address]  Office Tel: [phone]    
Patient is a 49y old  Male who presents with a chief complaint of The patient is a 49y Male was brought to the ER because of weakness and difficulty in walking. (30 May 2025 18:33)      SUBJECTIVE / OVERNIGHT EVENTS:    Events noted. Sitting in a chair  Mother at bedside  CONSTITUTIONAL: No fever,  or fatigue  RESPIRATORY: No cough, wheezing,  No shortness of breath  CARDIOVASCULAR: No chest pain, palpitations  GASTROINTESTINAL: No abdominal or epigastric pain.       MEDICATIONS  (STANDING):  allopurinol 300 milliGRAM(s) Oral daily  artificial tears (preservative free) Ophthalmic Solution 1 Drop(s) Both EYES four times a day  ascorbic acid 500 milliGRAM(s) Oral daily  enoxaparin Injectable 40 milliGRAM(s) SubCutaneous every 24 hours  levETIRAcetam 1000 milliGRAM(s) Oral two times a day  levothyroxine 50 MICROGram(s) Oral daily  omega-3-Acid Ethyl Esters 2 Gram(s) Oral two times a day  sodium chloride 0.9%. 1000 milliLiter(s) (100 mL/Hr) IV Continuous <Continuous>    MEDICATIONS  (PRN):        CAPILLARY BLOOD GLUCOSE        I&O's Summary      T(C): 36.7 (05-31-25 @ 21:22), Max: 36.7 (05-31-25 @ 04:53)  HR: 70 (05-31-25 @ 21:22) (56 - 70)  BP: 97/66 (05-31-25 @ 21:22) (96/65 - 104/68)  RR: 18 (05-31-25 @ 21:22) (17 - 18)  SpO2: 94% (05-31-25 @ 21:22) (94% - 100%)    PHYSICAL EXAM:    NECK: Supple, No JVD  CHEST/LUNG: Clear to auscultation bilaterally; No wheezing.  HEART: Regular rate and rhythm; No murmurs, rubs, or gallops  ABDOMEN: Soft, Nontender, Nondistended; Bowel sounds present  EXTREMITIES:   No edema  NEUROLOGY: AA      LABS:                        12.3   3.73  )-----------( 202      ( 31 May 2025 07:24 )             39.3     05-31    141  |  104  |  12  ----------------------------<  73  3.8   |  23  |  0.95    Ca    9.2      31 May 2025 07:24    TPro  6.7  /  Alb  4.1  /  TBili  0.7  /  DBili  x   /  AST  29  /  ALT  32  /  AlkPhos  64  05-31    PT/INR - ( 30 May 2025 11:55 )   PT: 12.9 sec;   INR: 1.13 ratio         PTT - ( 30 May 2025 11:55 )  PTT:28.3 sec      Urinalysis Basic - ( 31 May 2025 07:24 )    Color: x / Appearance: x / SG: x / pH: x  Gluc: 73 mg/dL / Ketone: x  / Bili: x / Urobili: x   Blood: x / Protein: x / Nitrite: x   Leuk Esterase: x / RBC: x / WBC x   Sq Epi: x / Non Sq Epi: x / Bacteria: x      CAPILLARY BLOOD GLUCOSE            RADIOLOGY & ADDITIONAL TESTS:    Imaging Personally Reviewed:    Consultant(s) Notes Reviewed:      Care Discussed with Consultants/Other Providers:    Alonso Diamond MD, CMD, FACP    [address] [address]  Office Tel: [phone]

## 2025-06-02 NOTE — DISCHARGE NOTE PROVIDER - NSDCMRMEDTOKEN_GEN_ALL_CORE_FT
allopurinol 300 mg oral tablet: 1 tab(s) orally once a day  ascorbic acid 500 mg oral tablet: 1 tab(s) orally once a day  Benefiber Advanced oral powder for reconstitution: 15 gram(s) orally 2 times a day  clotrimazole 1% topical cream: Apply topically to affected area 2 times a day  docusate sodium 100 mg oral capsule: 2 cap(s) orally once a day (at bedtime)  Home Physical Therapy: evaluate and treat  levETIRAcetam 1000 mg oral tablet: 1 tab(s) orally 2 times a day  levothyroxine 50 mcg (0.05 mg) oral tablet: 1 tab(s) orally once a day  ocular lubricant: 1 drop(s) in each eye 4 times a day as needed for  dry eyes  Omega-3 1000 mg oral capsule: 2 cap(s) orally 2 times a day   allopurinol 300 mg oral tablet: 1 tab(s) orally once a day  ascorbic acid 500 mg oral tablet: 1 tab(s) orally once a day  Benefiber Advanced oral powder for reconstitution: 15 gram(s) orally 2 times a day  clotrimazole 1% topical cream: Apply topically to affected area 2 times a day  Commode: NOY 99  Wt - 81.00kg  Ht - 167.6cm  docusate sodium 100 mg oral capsule: 2 cap(s) orally once a day (at bedtime)  Home Physical Therapy: evaluate and treat  levETIRAcetam 1000 mg oral tablet: 1 tab(s) orally 2 times a day  levothyroxine 50 mcg (0.05 mg) oral tablet: 1 tab(s) orally once a day  ocular lubricant: 1 drop(s) in each eye 4 times a day as needed for  dry eyes  Omega-3 1000 mg oral capsule: 2 cap(s) orally 2 times a day   allopurinol 300 mg oral tablet: 1 tab(s) orally once a day  ascorbic acid 500 mg oral tablet: 1 tab(s) orally once a day  Benefiber Advanced oral powder for reconstitution: 15 gram(s) orally 2 times a day  clotrimazole 1% topical cream: Apply topically to affected area 2 times a day  Commode: NOY 99  Wt - 81.00kg  Ht - 167.6cm  docusate sodium 100 mg oral capsule: 2 cap(s) orally once a day (at bedtime)  Home Physical Therapy: evaluate and treat  levETIRAcetam 1000 mg oral tablet: 1 tab(s) orally 2 times a day  levothyroxine 50 mcg (0.05 mg) oral tablet: 1 tab(s) orally once a day  ocular lubricant: 1 drop(s) in each eye 4 times a day as needed for  dry eyes  Omega-3 1000 mg oral capsule: 2 cap(s) orally 2 times a day  PT: use as needed  shower chair: use as needed  walker: use as needed

## 2025-06-02 NOTE — DISCHARGE NOTE PROVIDER - HOSPITAL COURSE
Please let the patient know she has a UTI I have sent an antibiotic to her pharmacy.   This 49-year-old male with Down syndrome, hyperlipidemia, hypothyroidism, and a history of seizures and gout presented to the emergency room with generalized weakness and difficulty walking. His mother reported the weakness began on Sunday and worsened to the point where he was having difficulty ambulating, speaking slowly, and appeared tired. She denied any focal complaints or signs of infection. His neurologist recommended evaluation at this facility. A CT of the head and CTA of the head and neck were negative. The differential diagnosis included altered mental status in the setting of Down syndrome, possible seizure activity, and generalized weakness. He was maintained on his current Keppra dose. An infectious workup was negative, an EEG was ordered to rule out seizures, and TSH was checked. Physical therapy was consulted. He was discharged with a plan for continued outpatient management. This 49-year-old male with Down syndrome, hyperlipidemia, hypothyroidism, and a history of seizures and gout presented to the emergency room with generalized weakness and difficulty walking. His mother reported the weakness began on Sunday and worsened to the point where he was having difficulty ambulating, speaking slowly, and appeared tired. She denied any focal complaints or signs of infection. His neurologist recommended evaluation at this facility. A CT of the head and CTA of the head and neck were negative. The differential diagnosis included altered mental status in the setting of Down syndrome, possible seizure activity, and generalized weakness. He was maintained on his current Keppra dose. An infectious workup was negative, an EEG was ordered and ruled out seizures, and TSH was checked. Physical therapy was consulted. He was discharged with a plan for continued outpatient management.    Hospital Course:  EEG negative for seizures  Orthostatics negative  Keppra level WNL       Important Medication Changes and Reason:  See MAR     Active or Pending Issues Requiring Follow-up:  Follow up with neurology -1 week   Follow up with PCP-2 weeks      Advanced Directives:   [x ] Full code  [ ] DNR  [ ] Hospice    Discharge Diagnoses:  Weakness

## 2025-06-02 NOTE — DISCHARGE NOTE PROVIDER - CARE PROVIDER_API CALL
Rodney Schroeder  Neurology  [address], [address]  [place] [address]  Phone: [phone]  Fax: [phone]  Follow Up Time: 1 week    CRISTOFER QUINONES  [address] [place] [address]  Phone: [phone]  Fax: [phone]  Follow Up Time: 2 weeks

## 2025-06-03 ENCOUNTER — TRANSCRIPTION ENCOUNTER (OUTPATIENT)
Age: 50
End: 2025-06-03

## 2025-06-03 VITALS
OXYGEN SATURATION: 98 % | DIASTOLIC BLOOD PRESSURE: 67 MMHG | TEMPERATURE: 98 F | RESPIRATION RATE: 17 BRPM | HEART RATE: 87 BPM | SYSTOLIC BLOOD PRESSURE: 100 MMHG

## 2025-06-03 LAB
LEVETIRACETAM SERPL-MCNC: 23 UG/ML — SIGNIFICANT CHANGE UP (ref 10–40)
LEVETIRACETAM SERPL-MCNC: 30.4 UG/ML — SIGNIFICANT CHANGE UP (ref 10–40)

## 2025-06-03 PROCEDURE — 85027 COMPLETE CBC AUTOMATED: CPT

## 2025-06-03 PROCEDURE — 87637 SARSCOV2&INF A&B&RSV AMP PRB: CPT

## 2025-06-03 PROCEDURE — 0241U: CPT

## 2025-06-03 PROCEDURE — 70498 CT ANGIOGRAPHY NECK: CPT

## 2025-06-03 PROCEDURE — 81003 URINALYSIS AUTO W/O SCOPE: CPT

## 2025-06-03 PROCEDURE — 71045 X-RAY EXAM CHEST 1 VIEW: CPT

## 2025-06-03 PROCEDURE — 87040 BLOOD CULTURE FOR BACTERIA: CPT

## 2025-06-03 PROCEDURE — 80048 BASIC METABOLIC PNL TOTAL CA: CPT

## 2025-06-03 PROCEDURE — 85730 THROMBOPLASTIN TIME PARTIAL: CPT

## 2025-06-03 PROCEDURE — 82435 ASSAY OF BLOOD CHLORIDE: CPT

## 2025-06-03 PROCEDURE — 82962 GLUCOSE BLOOD TEST: CPT

## 2025-06-03 PROCEDURE — 87086 URINE CULTURE/COLONY COUNT: CPT

## 2025-06-03 PROCEDURE — 93005 ELECTROCARDIOGRAM TRACING: CPT

## 2025-06-03 PROCEDURE — 95816 EEG AWAKE AND DROWSY: CPT

## 2025-06-03 PROCEDURE — 99285 EMERGENCY DEPT VISIT HI MDM: CPT

## 2025-06-03 PROCEDURE — 85610 PROTHROMBIN TIME: CPT

## 2025-06-03 PROCEDURE — 85025 COMPLETE CBC W/AUTO DIFF WBC: CPT

## 2025-06-03 PROCEDURE — 82947 ASSAY GLUCOSE BLOOD QUANT: CPT

## 2025-06-03 PROCEDURE — 97162 PT EVAL MOD COMPLEX 30 MIN: CPT

## 2025-06-03 PROCEDURE — 70496 CT ANGIOGRAPHY HEAD: CPT

## 2025-06-03 PROCEDURE — 36415 COLL VENOUS BLD VENIPUNCTURE: CPT

## 2025-06-03 PROCEDURE — 84132 ASSAY OF SERUM POTASSIUM: CPT

## 2025-06-03 PROCEDURE — 82803 BLOOD GASES ANY COMBINATION: CPT

## 2025-06-03 PROCEDURE — 70450 CT HEAD/BRAIN W/O DYE: CPT

## 2025-06-03 PROCEDURE — 84295 ASSAY OF SERUM SODIUM: CPT

## 2025-06-03 PROCEDURE — 85014 HEMATOCRIT: CPT

## 2025-06-03 PROCEDURE — 85018 HEMOGLOBIN: CPT

## 2025-06-03 PROCEDURE — 80177 DRUG SCRN QUAN LEVETIRACETAM: CPT

## 2025-06-03 PROCEDURE — 82330 ASSAY OF CALCIUM: CPT

## 2025-06-03 PROCEDURE — 80053 COMPREHEN METABOLIC PANEL: CPT

## 2025-06-03 PROCEDURE — 83605 ASSAY OF LACTIC ACID: CPT

## 2025-06-03 PROCEDURE — 84443 ASSAY THYROID STIM HORMONE: CPT

## 2025-06-03 RX ADMIN — Medication 300 MILLIGRAM(S): at 08:54

## 2025-06-03 RX ADMIN — OMEGA-3-ACID ETHYL ESTERS CAPSULES 2 GRAM(S): 1 CAPSULE, LIQUID FILLED ORAL at 05:31

## 2025-06-03 RX ADMIN — ENOXAPARIN SODIUM 40 MILLIGRAM(S): 100 INJECTION SUBCUTANEOUS at 05:31

## 2025-06-03 RX ADMIN — Medication 500 MILLIGRAM(S): at 08:54

## 2025-06-03 RX ADMIN — Medication 1 DROP(S): at 11:38

## 2025-06-03 RX ADMIN — LEVETIRACETAM 1000 MILLIGRAM(S): 10 INJECTION, SOLUTION INTRAVENOUS at 08:53

## 2025-06-03 RX ADMIN — Medication 50 MICROGRAM(S): at 05:31

## 2025-06-03 RX ADMIN — Medication 1 DROP(S): at 05:31

## 2025-06-03 NOTE — DISCHARGE NOTE NURSING/CASE MANAGEMENT/SOCIAL WORK - PATIENT PORTAL LINK FT
You can access the FollowMyHealth Patient Portal offered by [facility] by registering at the following website: [email or web address]. By joining [email or web address]’s FollowMyHealth portal, you will also be able to view your health information using other applications (apps) compatible with our system.

## 2025-06-03 NOTE — CHART NOTE - NSCHARTNOTEFT_GEN_A_CORE
Nutrition Services    Consult received for "MST Score >/= 2." Upon chart review, patient with stable weight and does not currently meet criteria for Protein Calorie Malnutrition risk per MST. RD remains available for assessment per protocol or as needed.     Ashlee Corrigan, BENNYN, CDN (Available via [employer] Teams)
Patient will require a rolling walker at home due to their diagnosis of weakness, unsteady gait to help complete MRADLs
Patient will require a shower chair at home due to their diagnosis of weakness to help complete MRADLs
Patient needs a commode, secondary to diagnoses of weakness, downs syndrome, gout.  The beneficiary is confined to a single room without a toilet in that room.

## 2025-06-05 LAB
CULTURE RESULTS: SIGNIFICANT CHANGE UP
CULTURE RESULTS: SIGNIFICANT CHANGE UP
SPECIMEN SOURCE: SIGNIFICANT CHANGE UP
SPECIMEN SOURCE: SIGNIFICANT CHANGE UP

## 2025-08-06 ENCOUNTER — APPOINTMENT (OUTPATIENT)
Dept: OPHTHALMOLOGY | Facility: CLINIC | Age: 50
End: 2025-08-06